# Patient Record
Sex: FEMALE | Race: WHITE | NOT HISPANIC OR LATINO | Employment: FULL TIME | URBAN - METROPOLITAN AREA
[De-identification: names, ages, dates, MRNs, and addresses within clinical notes are randomized per-mention and may not be internally consistent; named-entity substitution may affect disease eponyms.]

---

## 2024-01-04 ENCOUNTER — APPOINTMENT (OUTPATIENT)
Dept: GYNECOLOGIC ONCOLOGY | Facility: CLINIC | Age: 44
End: 2024-01-04
Payer: COMMERCIAL

## 2024-01-17 NOTE — PROGRESS NOTES
Patient ID: Gabriela Alaniz is a 43 y.o. female.  Primary Care Provider: Good Jay MD    Subjective    Ref Provider: MARTIN Burris CNP // DAVIDA Tinoco MD     HPI: [id number]  presenting in GYN/Oncology consultation regarding evaluation of abnormal Pap and colposcopy with high grade cervical dysplasia. Patient reports onset of heavy vaginal bleeding with passage of clots and intermittent heavy flow requiring multiple pads per hour from  - . Prolonged cycle with alternating spotting with heavy flow. Denies dizzness/lightheadedness, has not required blood transfusion. Has not had similar episode in the past. Denies abdominopelvic pain; no postcoital bleeding or abnormal vaginal discharge. Denies flank pain but has had progressively worsening back pain the past month with associated leg soreness. Otherwise states she is in her usual state of health.     Pathology:   10/3/22 - Pap: HSIL  10/17/22 - colposcopy: ECC - HSIL in background of fragments of unremarkable endocervical epithelium; 3 o'clock biopsy - LSIL (CIN1); 9 o'clock biopsy - HSIL with focal involvement of endocervical glands; 12 o'clock biopsy - HSIL    Recent Imaging:  10/14/22 - TVUS  Anteverted uterus (6.5x4.8x7.7cm); fibroids: 4.4x3.1cm, 1.6x1.2cm, 1.5x1cm. Cervix with nabothian cysts; heterogenous endometrium, thickness 16.3mm. Right ovary with 2 simple cysts; 3.0 and 1.98cm - left ovary not visualized.   ============================================  Medical History:  - BMI 32  - Hx DVT with protein C deficiency - on Xarelto   - Depression  - Fibroadenoma of the breast    Surgical History:  - Hemorrhoidectomy    Obstetric/Gynecologic History: ; FTSVD x3, SAB x1  - Menarche age 11  - History of OCP or HRT use: yes, x2 years  - Last Pap: as per HPI; prior in 2016 - denies prior abnormal or history of HPV    Family History:   - MGM: breast cancer   - Denies family history of colon/ovarian/uterine cancers    Social History:  ; presents today with her  Mehul. Works as patient  at medical office.   - Tobacco: denies  - Alcohol: 1x/week  - Illicit substances, narcotics: denies     Health Maintenance:   - Last mammogram: 10/2022 - Cat 2    Code Status: FULL    Objective    There were no vitals taken for this visit.     Interval history:  Patient is a 44 yo s/p [facility] bilateral salpingectomy for LEN-3 on 2/15/23.  Here for annual pap.       Physical Exam:    Constitutional: Doing well. KRISTIAN  Eyes: PERRL  ENMT: Moist mucus membranes  Head/Neck: Supple. Symmetrical  Cardiovascular: Regular, rate and rhythm. 2+ equal pulses of the extremities  Respiratory/Thorax: CTA. RRR. Chest rise symmetrical.  Gastrointestinal: Non-distended, soft, non-tender  Genitourinary:   Normal external female genitalia. No vulvar lesions noted  Speculum exam: Smooth vaginal walls without lesions or masses. Vaginal cuff visualized without lesions.   Bimanual exam: Smooth vaginal wall without lesions or masses.  Surgically absent uterus, cervix, and adnexa.    Rectovaginal exam: smooth rectovaginal septum without lesions or masses  Musculoskeletal: ROM intact, no joint swelling, normal strength  Extremities: No edema  Neurological: Alert and oriented x 3. Pleasant and cooperative.  Lymphatic: No lymphadenopathy. No lymphedema  Psychological: Appropriate mood and behavior  Skin: Warm and dry, no lesions, no rashes    A complete review of systems was performed and all systems were normal except what is noted in the interval history.          Assessment/Plan       PLAN:

## 2024-01-18 ENCOUNTER — APPOINTMENT (OUTPATIENT)
Dept: GYNECOLOGIC ONCOLOGY | Facility: CLINIC | Age: 44
End: 2024-01-18
Payer: COMMERCIAL

## 2025-05-28 NOTE — PROGRESS NOTES
Patient ID: Gabriela Alaniz is a 45 y.o. female.  Primary Care Provider: Good Jay MD    Subjective    HPI:  presenting in GYN/Oncology consultation regarding evaluation of abnormal Pap and colposcopy with high grade cervical dysplasia.     Pathology:   10/3/22 - Pap: HSIL  10/17/22 - colposcopy: ECC - HSIL in background of fragments of unremarkable endocervical epithelium; 3 o'clock biopsy - LSIL (CIN1); 9 o'clock biopsy - HSIL with focal involvement of endocervical glands; 12 o'clock biopsy - HSIL       FINAL DIAGNOSIS   A. UTERUS, CERVIX AND FALLOPIAN TUBES, TOTAL HYSTERECTOMY AND BILATERAL   SALPINGECTOMY:   -- NO RESIDUAL SQUAMOUS INTRAEPITHELIAL LESION IDENTIFIED, INCLUDING   EXAMINATION OF MARGINS.   -- CERVIX WITH MILD CHRONIC INFLAMMATION AND SCARRING.   -- SECRETORY PATTERN ENDOMETRIUM.   -- LEIOMYOMATA (MEASURING UP TO 3.7 CM).   -- FALLOPIAN TUBES WITH NO SPECIFIC PATHOLOGIC CHANGES.   ============================================  Medical History:  - BMI 32  - Hx DVT with protein C deficiency - on Xarelto   - Depression  - Fibroadenoma of the breast     Surgical History:  - Hemorrhoidectomy     Obstetric/Gynecologic History: ; FTSVD x3, SAB x1  - Menarche age 11  - History of OCP or HRT use: yes, x2 years  - Last Pap: as per HPI; prior in 2016 - denies prior abnormal or history of HPV     Family History:   - MGM: breast cancer   - Denies family history of colon/ovarian/uterine cancers     Social History: ; presents today with her  Mehul. Works as patient  at medical office.   - Tobacco: denies  - Alcohol: 1x/week  - Illicit substances, narcotics: denies      Health Maintenance:   - Last mammogram: 10/2022 - Cat 2     Code Status: FULL      Objective    Visit Vitals  /77 (BP Location: Right arm, Patient Position: Sitting, BP Cuff Size: Adult)   Pulse 81   Resp 18        Interval history:  Patient is a 45 year old female with history of  cervical dysplasia now s/p [facility], BS 2/2023.  Not been seen since.  Needs pap.  Patient denies any vaginal bleeding, pink tinged discharge. Patient denies any constipation or diarrhea.  Appetite has been good.  Energy levels are baseline.  Patient denies hematuria.  Patient denies urinary leakage or incontinence. Patient is currently sexually active, denies dyspareunia or vaginal dryness. Patient on Wegovy now, lost 43 lbs.       Physical Exam:    Constitutional: Doing well. KRISTIAN  Eyes: PERRL  ENMT: Moist mucus membranes  Head/Neck: Supple. Symmetrical  Cardiovascular: Regular, rate and rhythm. 2+ equal pulses of the extremities  Respiratory/Thorax: CTA. RRR. Chest rise symmetrical.  Gastrointestinal: Non-distended, soft, non-tender  Genitourinary:   Normal external female genitalia. No vulvar lesions noted  Speculum exam: Smooth vaginal walls without lesions or masses. Vaginal cuff visualized without lesions.   Bimanual exam: Smooth vaginal wall without lesions or masses.  Surgically absent uterus, cervix. No adnexal masses noted.     Rectovaginal exam: smooth rectovaginal septum without lesions or masses  Musculoskeletal: ROM intact, no joint swelling, normal strength  Extremities: No edema  Neurological: Alert and oriented x 3. Pleasant and cooperative.  Lymphatic: No lymphadenopathy. No lymphedema  Psychological: Appropriate mood and behavior  Skin: Warm and dry, no lesions, no rashes    A complete review of systems was performed and all systems were normal except what is noted in the interval history.      Assessment/Plan    Patient is a 45 year old female with history of cervical dysplasia now s/p [facility], BS 2/2023. KRISTIAN 2 years.        PLAN: Did pap, F/U results. If negative F/U in 1 year or as needed  Physical examination was within normal limits today.  She is currently KRISTIAN.  We reviewed signs and symptoms of possible recurrence with the patient and she will call our office should she experience any of these.

## 2025-05-29 ENCOUNTER — APPOINTMENT (OUTPATIENT)
Dept: GYNECOLOGIC ONCOLOGY | Facility: CLINIC | Age: 45
End: 2025-05-29
Payer: COMMERCIAL

## 2025-05-29 VITALS
DIASTOLIC BLOOD PRESSURE: 77 MMHG | BODY MASS INDEX: 28.03 KG/M2 | OXYGEN SATURATION: 98 % | HEART RATE: 81 BPM | RESPIRATION RATE: 18 BRPM | SYSTOLIC BLOOD PRESSURE: 113 MMHG | WEIGHT: 164.2 LBS | HEIGHT: 64 IN

## 2025-05-29 DIAGNOSIS — N87.9 CERVICAL DYSPLASIA: Primary | ICD-10-CM

## 2025-05-29 PROCEDURE — 88175 CYTOPATH C/V AUTO FLUID REDO: CPT

## 2025-05-29 PROCEDURE — 99213 OFFICE O/P EST LOW 20 MIN: CPT | Performed by: NURSE PRACTITIONER

## 2025-05-29 PROCEDURE — 87626 HPV SEP HI-RSK TYP&POOL RSLT: CPT

## 2025-05-29 PROCEDURE — 3008F BODY MASS INDEX DOCD: CPT | Performed by: NURSE PRACTITIONER

## 2025-05-29 RX ORDER — CITALOPRAM 40 MG/1
40 TABLET ORAL
COMMUNITY
Start: 2025-05-08

## 2025-05-29 RX ORDER — BUPROPION HYDROCHLORIDE 150 MG/1
150 TABLET, EXTENDED RELEASE ORAL 2 TIMES DAILY
COMMUNITY
Start: 2025-05-08

## 2025-05-29 RX ORDER — PANTOPRAZOLE SODIUM 40 MG/1
40 TABLET, DELAYED RELEASE ORAL 2 TIMES DAILY
COMMUNITY
Start: 2025-05-08 | End: 2026-05-08

## 2025-05-29 RX ORDER — AMOXICILLIN AND CLAVULANATE POTASSIUM 875; 125 MG/1; MG/1
1 TABLET, FILM COATED ORAL
COMMUNITY
Start: 2024-12-09

## 2025-05-29 RX ORDER — ERGOCALCIFEROL 1.25 MG/1
1.25 CAPSULE ORAL
COMMUNITY

## 2025-05-29 RX ORDER — SEMAGLUTIDE 2.4 MG/.75ML
2.4 INJECTION, SOLUTION SUBCUTANEOUS WEEKLY
COMMUNITY
Start: 2025-05-08 | End: 2025-07-31

## 2025-05-29 ASSESSMENT — PAIN SCALES - GENERAL: PAINLEVEL_OUTOF10: 0-NO PAIN

## 2025-06-12 LAB
CYTOLOGY CMNT CVX/VAG CYTO-IMP: NORMAL
HPV HR 12 DNA GENITAL QL NAA+PROBE: NEGATIVE
HPV HR GENOTYPES PNL CVX NAA+PROBE: NEGATIVE
HPV16 DNA SPEC QL NAA+PROBE: NEGATIVE
HPV18 DNA SPEC QL NAA+PROBE: NEGATIVE
LAB AP HPV GENOTYPE QUESTION: YES
LAB AP HPV HR: NORMAL
LABORATORY COMMENT REPORT: NORMAL
PATH REPORT.TOTAL CANCER: NORMAL

## 2026-05-28 ENCOUNTER — APPOINTMENT (OUTPATIENT)
Dept: GYNECOLOGIC ONCOLOGY | Facility: CLINIC | Age: 46
End: 2026-05-28
Payer: COMMERCIAL